# Patient Record
(demographics unavailable — no encounter records)

---

## 2024-10-21 NOTE — CONSULT LETTER
[Dear  ___] : Dear  [unfilled], [Courtesy Letter:] : I had the pleasure of seeing your patient, [unfilled], in my office today. [Please see my note below.] : Please see my note below. [Referral Closing:] : Thank you very much for seeing this patient.  If you have any questions, please do not hesitate to contact me. [Sincerely,] : Sincerely, [FreeTextEntry3] : Jimy Zepeda PA-C

## 2024-10-21 NOTE — PROCEDURE
[Hoarseness] : hoarseness not clearly evaluated by indirect laryngoscopy [Dysphagia] : dysphagia not clearly evaluated by indirect laryngoscopy [Complicated Symptoms] : complicated symptoms requiring more thorough examination than provided by mirror [Globus] : globus [de-identified] : Procedure:  Flexible Fiberoptic Laryngoscopy: Risks, benefits, and alternatives of flexible endoscopy were explained to the patient.  The patient gave oral consent to proceed.  The flexible scope was inserted into the left nasal cavity and advanced towards the nasopharynx.  Visualized mucosa over the turbinates and septum were as described above.  The nasopharynx was clear.    base of tongue with white coating not clearing with swallowing consistent with candida glottic gap  bowing of the left vocal cord Edema and erythema of the postcricoid, arytenoids and interarytenoids.

## 2024-10-21 NOTE — PHYSICAL EXAM
[] : septum deviated to the right [Midline] : trachea located in midline position [Normal] : no rashes [de-identified] : tender lymph level 3 [Hearing Loss Right Only] : normal [Hearing Loss Left Only] : normal

## 2024-10-21 NOTE — ASSESSMENT
[FreeTextEntry1] : Reviewed and reconciled medications, allergies, PMHx, PSHx, SocHx, FMHx.   physical exam: right ear: looks normal left ear: looks normal Inflamed turbinates deviated septum  Procedure:  Flexible Fiberoptic Laryngoscopy: Risks, benefits, and alternatives of flexible endoscopy were explained to the patient.  The patient gave oral consent to proceed.  The flexible scope was inserted into the left nasal cavity and advanced towards the nasopharynx.  Visualized mucosa over the turbinates and septum were as described above.  The nasopharynx was clear.    base of tongue with white coating not clearing with swallowing consistent with candida glottic gap  bowing of the left vocal cord Edema and erythema of the postcricoid, arytenoids and interarytenoids.     Plan: Will do ct neck soft tissue with iv contrast to evaluate level 3 tenderness/mass clotrimazole lozenges for 10days diflucan  Start Omeprazole QAM 30 minutes after other medications and 30 minutes before morning meal. Discussed videostrobe/feest depending on ct finding and response to treatment.    Case discussed with Dr. Hernandes

## 2024-10-21 NOTE — HISTORY OF PRESENT ILLNESS
[de-identified] : Patient presents for throat discomfort, trouble swallowing, throat clearing for the last 2 weeks. She also notes that she has a sensation like something is preventing her from swallowing or compressing her neck area.  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 No hx of DM or HTN. SHe notes she does drink caffefine in the morning on empty stomach, she does some late night earing. When she points towards where the pain is points at the larynx area. She notes she has been taking new hill tablet as a vitamin for the last month.

## 2024-11-06 NOTE — PROCEDURE
[Complicated Symptoms] : complicated symptoms requiring more thorough examination than provided by mirror [Globus] : globus [de-identified] : Procedure:  Flexible Fiberoptic Laryngoscopy: Risks, benefits, and alternatives of flexible endoscopy were explained to the patient.  The patient gave oral consent to proceed.  The flexible scope was inserted into the left nasal cavity and advanced towards the nasopharynx.  Visualized mucosa over the turbinates and septum were as described above.  The nasopharynx was clear.   base of tongue with slight amount white coating not clearing with swallowing  Edema and erythema of the postcricoid, arytenoids and interarytenoids.

## 2024-11-06 NOTE — HISTORY OF PRESENT ILLNESS
[de-identified] : Patient presents for throat discomfort, trouble swallowing, throat clearing for the last 2 weeks. She also notes that she has a sensation like something is preventing her from swallowing or compressing her neck area.  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 No hx of DM or HTN. SHe notes she does drink caffefine in the morning on empty stomach, she does some late night earing. When she points towards where the pain is points at the larynx area. She notes she has been taking new hill tablet as a vitamin for the last month.  [FreeTextEntry1] : 11/6/24: Patient presents as a follow up. States she finished medication and hasnt had any improvement in her symptoms. She notes that she recently started famotidine at night. Continues to take omeprazole. She notes that she continues to do late night eating and hasnt changed her eating habits.

## 2024-11-06 NOTE — ASSESSMENT
[FreeTextEntry1] : Reviewed and reconciled medications, allergies, PMHx, PSHx, SocHx, FMHx.   physical exam: right ear: looks normal left ear: looks normal Inflamed turbinates deviated septum  Procedure:  Flexible Fiberoptic Laryngoscopy: Risks, benefits, and alternatives of flexible endoscopy were explained to the patient.  The patient gave oral consent to proceed.  The flexible scope was inserted into the left nasal cavity and advanced towards the nasopharynx.  Visualized mucosa over the turbinates and septum were as described above.  The nasopharynx was clear.   base of tongue with slight amount white coating not clearing with swallowing  Edema and erythema of the postcricoid, arytenoids and interarytenoids.     Plan: She was hesistant on getting ct neck soft tissue with iv contrast to evaluate level 3 tenderness/mass but will do so this week Renewed clotrimazole lozenges for 10days and diflucan  Continue Omeprazole and famotidine Schedule  videostrobe/feest   Case discussed with Dr. Hernandes

## 2024-11-06 NOTE — PHYSICAL EXAM
[Midline] : trachea located in midline position [Removed] : palatine tonsils previously removed [Normal] : no rashes [de-identified] : tender lymph level 3 [Hearing Loss Right Only] : normal [Hearing Loss Left Only] : normal [FreeTextEntry8] : minimal cerumen removed

## 2025-01-03 NOTE — PHYSICAL EXAM
[Midline] : trachea located in midline position [Removed] : palatine tonsils previously removed [Normal] : no rashes [de-identified] : tender lymph level 3 [Hearing Loss Right Only] : normal [Hearing Loss Left Only] : normal [FreeTextEntry8] : cerumen removed via curettage [FreeTextEntry9] : cerumen removed via curettage

## 2025-01-03 NOTE — PROCEDURE
[Complicated Symptoms] : complicated symptoms requiring more thorough examination than provided by mirror [de-identified] : Procedure:  Flexible Fiberoptic Laryngoscopy: Risks, benefits, and alternatives of flexible endoscopy were explained to the patient.  The patient gave oral consent to proceed.  The flexible scope was inserted into the left nasal cavity and advanced towards the nasopharynx.  Visualized mucosa over the turbinates and septum were as described above.  The nasopharynx with slight mucoid dishcarge base of tongue with slight amount white coating not clearing with swallowing  slight edema and erythema of the postcricoid, arytenoids and interarytenoids. no glottic gap seen

## 2025-01-03 NOTE — ASSESSMENT
WHEN TO EXPECT YOUR LAB/TEST RESULTS    * You will be contacted by letter or phone regarding your lab results after ALL lab results are back-within 2 weeks of your appointment. All Normal results will be notified by letter; all abnormal results will be called.    * Please call if you have not heard within 2 weeks: [phone]    * Quickest way to review your lab results is through [employer].  If you do not have an active account Please register on [email or web address] or verify e-mail address with  and a link will be sent to your e-mail address.    *Physical Exams are done annually (every year). Pap tests are done every 3-5 years; unless otherwise specified.    SUPPORT HEALTHY LIFESTYLE    * Minimum 7 hours of sleep-preferably 1/2 before midnight  * Caffeine in moderation  * Eat regular, healthy meals in relaxed environment - protein, decrease sugars, salt, 1/2 plate is vegetables, ample amount of greens, legumes/lentils  Daily movement/exercise  *Stress Management: hobbies, exercise, yoga, social activities, medication  *Vitamin/Mineral Supplementation:   Mornings- Vitamin C 500-1000 mg, B-Complex, Vitamin D3 2000IU   Evenings: Magnesium Citrate 250-500 mg at bedtime   Calcium: 1200mg includes diet and supplementation     [FreeTextEntry1] : Reviewed and reconciled medications, allergies, PMHx, PSHx, SocHx, FMHx.   physical exam: right ear: cerumen removed via curettage left ear:  cerumen removed via curettage Inflamed turbinates with mucoid discharge in both sides of the nose deviated septum  Procedure:  Flexible Fiberoptic Laryngoscopy: Risks, benefits, and alternatives of flexible endoscopy were explained to the patient.  The patient gave oral consent to proceed.  The flexible scope was inserted into the left nasal cavity and advanced towards the nasopharynx.  Visualized mucosa over the turbinates and septum were as described above.  The nasopharynx with slight mucoid dishcarge base of tongue with slight amount white coating not clearing with swallowing  slight edema and erythema of the postcricoid, arytenoids and interarytenoids. no glottic gap seen     Plan: Continue Omeprazole and famotidine Continue therapy Follow up with Dr. Solis laryngologist    Case discussed with Dr. Hernandes

## 2025-01-03 NOTE — HISTORY OF PRESENT ILLNESS
[de-identified] : Patient presents for throat discomfort, trouble swallowing, throat clearing for the last 2 weeks. She also notes that she has a sensation like something is preventing her from swallowing or compressing her neck area.  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 No hx of DM or HTN. SHe notes she does drink caffefine in the morning on empty stomach, she does some late night earing. When she points towards where the pain is points at the larynx area. She notes she has been taking new hill tablet as a vitamin for the last month.  11/6/24: Patient presents as a follow up. States she finished medication and hasnt had any improvement in her symptoms. She notes that she recently started famotidine at night. Continues to take omeprazole. She notes that she continues to do late night eating and hasnt changed her eating habits.  [FreeTextEntry1] : 1/3/24: Patient notes she has been attending therapy was feeling slightly better but recently had flu like symptoms and placed on steroid taper now reporting perilaryngeal pain and pain when she swallows. She states she underwent upper gi endoscopy the white coating base of the tongue was cultured and didnt grow any fungus. She notes she is following reflux diet and following all relaxation and laryngeal massage exercises designed to reduce perilaryngeal muscle tension but the discomfort remains.

## 2025-01-06 NOTE — PROCEDURE
[de-identified] : Stroboscopic Laryngoscopy Procedure Note:  Indication:	Assess laryngeal biomechanics and vocal fold oscillation.  Description of Procedure:	Informed consent was verbally obtained from the patient prior to the procedure. The patient was seated in the clinic chair. Topical anesthesia was achieved by first spraying the nasal cavities with 4% lidocaine and nasal decongestant.   Findings:  Supraglottis: no masses or lesions  Glottis:    Structure:                        Right: very very mid fold polyp                       Left:  crisp and shows no lesions or masses                 Mobility:                        Right:  normal                        Left:  normal                Amplitude:                        Right:  normal                       Left:  normal                Closure: posterior glottic gap, incomplete closure at high frequency                 Wave symmetry:  minimally asymmetric  Subglottis: no masses or lesions within the visualized subglottis Visualized airway is widely patent.

## 2025-01-06 NOTE — HISTORY OF PRESENT ILLNESS
[de-identified] : VISHNU MORRIS  is a 43 year old woman who presents to the [facility] with a chief complaint of globus sensation and white patch on back of tongue.  She is referred by KASSIDY Zepeda who diagnosed her with LPR 3 months ago. Following with GI. Has a white patch on back of base of tongue which was cultured 2x showing no candida. Tried 2 rounds of antifungals.  They have had globus sensation in their throat for 3 months. They do have a prior CT neck done at [facility].  They do have prior smoking history - intermittently for 30 years. They do have prior history of alcoholism/alcohol abuse - recently gave up alcohol They do not have frequent ear pain. They do not have a prior swallow study in the past 1 year. They have seen the following types of providers for this problem: ENT They have taken the following medications for this problem: antifungals, omeprazole and famotidine, voquenza  Doing or taking the following makes the globus sensation better: sleeping Doing the following makes the globus sensation worse: talking in a higher pitch, singing in a higher pitch (can no longer sing), clearing her throat  Has done approx 5 sessions of therapy with Dayan thus far.

## 2025-01-06 NOTE — ASSESSMENT
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  Managed by GI. I suspect that LPR started this issue but is not the reason it continues. I am in agreement with the voice therapy she has been undergoing.  However, as she still has significant symptoms we discussed the option of trialing gabapentin. Max of 900mg tid. The risks, benefits, and alternatives to care were discussed with the patient and understanding expressed.  Patient believes she is perimenopausal but as she is 2 weeks late for her period she has some concern for pregnancy and will hold off on starting until certain of lack of pregnancy. I would like to see her 1 month after starting med and she will call to schedule. She may choose in person vs telemedicine for next visit.   The white on the back of her tongue is not suspicious for candida and does not warrant biopsy and further medication at this time.

## 2025-02-04 NOTE — PROCEDURE
[de-identified] : Stroboscopic Laryngoscopy Procedure Note: Indication: Assess laryngeal biomechanics and vocal fold oscillation. Description of Procedure: Informed consent was verbally obtained from the patient prior to the procedure. The patient was seated in the clinic chair. Topical anesthesia was achieved by first spraying the nasal cavities with 4% lidocaine and nasal decongestant.  Findings: Supraglottis: no masses or lesions Glottis: Structure:  Right: very very mid fold polyp  Left: crisp and shows no lesions or masses  Mobility:  Right: normal  Left: normal  Amplitude:  Right: normal  Left: normal  Closure: posterior glottic gap, incomplete closure at high frequency  Wave symmetry: minimally asymmetric Subglottis: no masses or lesions within the visualized subglottis Visualized airway is widely patent.

## 2025-02-04 NOTE — HISTORY OF PRESENT ILLNESS
[de-identified] :  VISHNU MORRIS is a 43 year old woman who presents to the [facility] with a chief complaint of globus/laryngeal hypersensitivity, muscle tension dysphonia, and very small right vocal fold polyp. Last seen 1/6/25. At that time, I had recommended to continue voquenza and PPI managed by GI. She was to continue voice therapy and begin gabapentin. Was to follow up in 1mth. She has now completed voice therapy with minimal improvement.  She continues taking voquenza , gabapentin and omeprazole daily.  Reports MTD and globus sensation remain the same since the last visit.  Eating and drinking well with no s/s of aspiration. Recently has the flu 2 weeks ago, treated with Tamiflu.   Previously reported: globus sensation and white patch on back of tongue. She is referred by KASSIDY Zepeda who diagnosed her with LPR 3 months ago. Following with GI. Has a white patch on back of base of tongue which was cultured 2x showing no candida. Tried 2 rounds of antifungals. They have had globus sensation in their throat for 3 months. They do have a prior CT neck done at [facility]. They do have prior smoking history - intermittently for 30 years. They do have prior history of alcoholism/alcohol abuse - recently gave up alcohol They do not have frequent ear pain. They do not have a prior swallow study in the past 1 year. They have seen the following types of providers for this problem: ENT They have taken the following medications for this problem: antifungals, omeprazole and famotidine, voquenza Doing or taking the following makes the globus sensation better: sleeping Doing the following makes the globus sensation worse: talking in a higher pitch, singing in a higher pitch (can no longer sing), clearing her throat Has done approx 5 sessions of therapy with Dayan thus far.

## 2025-02-04 NOTE — ASSESSMENT
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 I believe she would get benefit from PT for myofascial release.   I do not suspect removing her polyp surgically would improve her high frequency singing.   For the gabapentin I have recommended she continue to gradually increase on dose to max of 900mg tid.  Will follow up with me as needed.  Understands how to wean off once at dose that works.

## 2025-02-04 NOTE — HISTORY OF PRESENT ILLNESS
[de-identified] :  VISHNU MORRIS is a 43 year old woman who presents to the [facility] with a chief complaint of globus/laryngeal hypersensitivity, muscle tension dysphonia, and very small right vocal fold polyp. Last seen 1/6/25. At that time, I had recommended to continue voquenza and PPI managed by GI. She was to continue voice therapy and begin gabapentin. Was to follow up in 1mth. She has now completed voice therapy with minimal improvement.  She continues taking voquenza , gabapentin and omeprazole daily.  Reports MTD and globus sensation remain the same since the last visit.  Eating and drinking well with no s/s of aspiration. Recently has the flu 2 weeks ago, treated with Tamiflu.   Previously reported: globus sensation and white patch on back of tongue. She is referred by KASSIDY Zepeda who diagnosed her with LPR 3 months ago. Following with GI. Has a white patch on back of base of tongue which was cultured 2x showing no candida. Tried 2 rounds of antifungals. They have had globus sensation in their throat for 3 months. They do have a prior CT neck done at [facility]. They do have prior smoking history - intermittently for 30 years. They do have prior history of alcoholism/alcohol abuse - recently gave up alcohol They do not have frequent ear pain. They do not have a prior swallow study in the past 1 year. They have seen the following types of providers for this problem: ENT They have taken the following medications for this problem: antifungals, omeprazole and famotidine, voquenza Doing or taking the following makes the globus sensation better: sleeping Doing the following makes the globus sensation worse: talking in a higher pitch, singing in a higher pitch (can no longer sing), clearing her throat Has done approx 5 sessions of therapy with Dayan thus far.

## 2025-02-04 NOTE — PROCEDURE
[de-identified] : Stroboscopic Laryngoscopy Procedure Note: Indication: Assess laryngeal biomechanics and vocal fold oscillation. Description of Procedure: Informed consent was verbally obtained from the patient prior to the procedure. The patient was seated in the clinic chair. Topical anesthesia was achieved by first spraying the nasal cavities with 4% lidocaine and nasal decongestant.  Findings: Supraglottis: no masses or lesions Glottis: Structure:  Right: very very mid fold polyp  Left: crisp and shows no lesions or masses  Mobility:  Right: normal  Left: normal  Amplitude:  Right: normal  Left: normal  Closure: posterior glottic gap, incomplete closure at high frequency  Wave symmetry: minimally asymmetric Subglottis: no masses or lesions within the visualized subglottis Visualized airway is widely patent.

## 2025-03-17 NOTE — HISTORY OF PRESENT ILLNESS
[de-identified] : VISHNU MORRIS is a 43 year old woman who presents to the [facility] with a chief complaint of globus/laryngeal hypersensitivity and very small right vocal fold polyp. Last seen 2/4/25. She was to continue increasing gabapentin and consider PT for myofascial release. Was to follow up PRN. Has been weaning herself off of gabapentin - taking 4 300mg pills a day. Went up to dose of 2700mg. Globus is unchanged. Feels like someone is pinching her larynx. Started PT for myofascial release with very little relief. Typically its worse by the end of the day. Follows with GI who suggested amitriptyline. Still having difficulty with higher pitches when speaking and singing. Denies dyspnea, fevers/chills, dysphagia.  Previously reported: globus sensation and white patch on back of tongue. She is referred by KASSIDY Zepeda who diagnosed her with LPR 3 months ago. Following with GI. Has a white patch on back of base of tongue which was cultured 2x showing no candida. Tried 2 rounds of antifungals. They have had globus sensation in their throat for 3 months. They do have a prior CT neck done at [facility]. They do have prior smoking history - intermittently for 30 years. They do have prior history of alcoholism/alcohol abuse - recently gave up alcohol They do not have frequent ear pain. They do not have a prior swallow study in the past 1 year. They have seen the following types of providers for this problem: ENT They have taken the following medications for this problem: antifungals, omeprazole and famotidine, voquenza Doing or taking the following makes the globus sensation better: sleeping Doing the following makes the globus sensation worse: talking in a higher pitch, singing in a higher pitch (can no longer sing), clearing her throat Has done approx 5 sessions of therapy with Dayan thus far.

## 2025-03-17 NOTE — ASSESSMENT
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 I am fine with her trailing amitriptyline by her GI but recommended she come off of gabapentin entirely before starting. Can decrease by 1 pill every other day. Will continue Voquenza  and omeprazole as prescribed by her GI. May follow up with me as needed.  I do not suspect removing her polyp surgically would improve her high frequency singing.

## 2025-03-17 NOTE — HISTORY OF PRESENT ILLNESS
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 Last seen 2/4/25. She was to continue increasing gabapentin and consider PT for myofascial release. Was to follow up PRN. Has been weaning herself off of gabapentin - taking 4 300mg pills a day. Went up to dose of 2700mg. Globus is unchanged. Feels like someone is pinching her larynx. Started PT for myofascial release with very little relief. Typically its worse by the end of the day. Follows with GI who suggested amitriptyline. Still having difficulty with higher pitches when speaking and singing. Denies dyspnea, fevers/chills, dysphagia.  Previously reported: globus sensation and white patch on back of tongue. She is referred by KASSIDY Zepeda who diagnosed her with LPR 3 months ago. Following with GI. Has a white patch on back of base of tongue which was cultured 2x showing no candida. Tried 2 rounds of antifungals. They have had globus sensation in their throat for 3 months. They do have a prior CT neck done at [facility]. They do have prior smoking history - intermittently for 30 years. They do have prior history of alcoholism/alcohol abuse - recently gave up alcohol They do not have frequent ear pain. They do not have a prior swallow study in the past 1 year. They have seen the following types of providers for this problem: ENT They have taken the following medications for this problem: antifungals, omeprazole and famotidine, voquenza Doing or taking the following makes the globus sensation better: sleeping Doing the following makes the globus sensation worse: talking in a higher pitch, singing in a higher pitch (can no longer sing), clearing her throat Has done approx 5 sessions of therapy with Dayan thus far.

## 2025-03-17 NOTE — PROCEDURE
[de-identified] : Laryngoscopy: Stroboscopic Laryngoscopy Procedure Note: Indication: Assess laryngeal biomechanics and vocal fold oscillation. Description of Procedure: Informed consent was verbally obtained from the patient prior to the procedure. The patient was seated in the clinic chair. Topical anesthesia was achieved by first spraying the nasal cavities with 4% lidocaine and nasal decongestant.  Findings: Supraglottis: no masses or lesions Glottis: Structure:  Right: very very small mid fold polyp  Left: crisp and shows no lesions or masses  Mobility:  Right: normal  Left: normal  Amplitude:  Right: normal  Left: normal  Closure: posterior glottic gap, incomplete closure at high frequency  Wave symmetry: minimally asymmetric Subglottis: no masses or lesions within the visualized subglottis Visualized airway is widely patent.

## 2025-03-17 NOTE — PROCEDURE
[de-identified] : Laryngoscopy: Stroboscopic Laryngoscopy Procedure Note: Indication: Assess laryngeal biomechanics and vocal fold oscillation. Description of Procedure: Informed consent was verbally obtained from the patient prior to the procedure. The patient was seated in the clinic chair. Topical anesthesia was achieved by first spraying the nasal cavities with 4% lidocaine and nasal decongestant.  Findings: Supraglottis: no masses or lesions Glottis: Structure:  Right: very very small mid fold polyp  Left: crisp and shows no lesions or masses  Mobility:  Right: normal  Left: normal  Amplitude:  Right: normal  Left: normal  Closure: posterior glottic gap, incomplete closure at high frequency  Wave symmetry: minimally asymmetric Subglottis: no masses or lesions within the visualized subglottis Visualized airway is widely patent.

## 2025-07-11 NOTE — HISTORY OF PRESENT ILLNESS
[de-identified] : VISHNU MORRIS is a 43 year old woman who presents to the [facility] with a chief complaint of globus/laryngeal hypersensitivity and very small right vocal fold polyp. Last seen 3/17/25. She was to continue with PT for myofascial release as she had minor improvement with this. She was to wean off of beatriz and trial amitriptyline recommended by her GI. She was to continue with voquenza and omeprazole prescribed by GI. She was to follow up PRN.   Previously reported: globus sensation and white patch on back of tongue. She is referred by KASSIDY Zepeda who diagnosed her with LPR 3 months ago. Following with GI. Has a white patch on back of base of tongue which was cultured 2x showing no candida. Tried 2 rounds of antifungals. They have had globus sensation in their throat for 3 months. They do have a prior CT neck done at [facility]. They do have prior smoking history - intermittently for 30 years. They do have prior history of alcoholism/alcohol abuse - recently gave up alcohol They do not have frequent ear pain. They do not have a prior swallow study in the past 1 year. They have seen the following types of providers for this problem: ENT They have taken the following medications for this problem: antifungals, omeprazole and famotidine, voquenza Doing or taking the following makes the globus sensation better: sleeping Doing the following makes the globus sensation worse: talking in a higher pitch, singing in a higher pitch (can no longer sing), clearing her throat Has done approx 5 sessions of therapy with Dayan thus far. She had prior EGD in Nov/Dec 2024 and reports was normal.  Prior CT neck done in Nov 2024 reported circumferential thickening of the walls of the upper esophagus which is incompletely evaluated.

## 2025-07-11 NOTE — ASSESSMENT
[FreeTextEntry1] : Assessment/Plan: #1 Globus/laryngeal hypersensitivity #2 Very small right vocal fold polyp  Previously has failed SLP, PPI, myofascial release, gabapentin. Ha shad prior CT neck and EGD.   At this point I am fine with her tapering off of the PPIs she is on bid.  As she got no improvement with these meds I do not suspect LPR is playing any role in this.   We discussed trialing amitriptyline vs pregabalin. She elected to try amitriptyline first. The risks, benefits, and alternatives to care were discussed with the patient and understanding expressed.  Instructions: 10mg tablets, taper up every 3 days by 10mg up to 30mg TID. (Begin with 1 pill every morning.  After 3 days increase to 1 pill in the morning and one at after dinner (2 pills per day).  In 3 more days, add 1 pill at noontime (3 pills per day) Stay at this dose for a week, if you notice no improvement or only partial response after that time, add 1 pill to morning dose (2 pills in morning, 1 pill noontime, 1 pill after dinner). If cough persists after 3 days, add 2nd pill at noontime (2 pills in morning, 2 pills noontime, 1 pill after dinner) If cough persists after 3 days, add 2nd pill to evening dose (2 pills in morning, 2 pills noontime, 2 pills after dinner).  You may increase to a maximum of 3 pills three times a day (9 pills daily) advancing every 3 days. At any point if you wish to slow down the taper to stay at a dose for longer that is okay. Do not stop this medication abruptly but decrease the daily dose over 1-2 weeks.  Please alert your provider if you note palpitations and decrease the dose by half.)    As this started with a very traumatic event we may discuss trial of psychotherapy and SSRI at next visit.   I will see back in 1 month via telemed.   Total time: 30 minutes; total time does not include time spent performing the procedure and its related elements. It does include all other face to face and non face to face pre and post visit tasks performed on the same date of service.